# Patient Record
Sex: MALE | Race: WHITE | HISPANIC OR LATINO | Employment: FULL TIME | URBAN - METROPOLITAN AREA
[De-identification: names, ages, dates, MRNs, and addresses within clinical notes are randomized per-mention and may not be internally consistent; named-entity substitution may affect disease eponyms.]

---

## 2023-09-21 ENCOUNTER — TRANSCRIBE ORDERS (OUTPATIENT)
Dept: ADMINISTRATIVE | Facility: HOSPITAL | Age: 28
End: 2023-09-21

## 2023-09-21 DIAGNOSIS — R10.13 EPIGASTRIC PAIN: ICD-10-CM

## 2023-09-21 DIAGNOSIS — R94.5 ABNORMAL LIVER FUNCTION: Primary | ICD-10-CM

## 2023-10-05 ENCOUNTER — HOSPITAL ENCOUNTER (OUTPATIENT)
Dept: ULTRASOUND IMAGING | Facility: HOSPITAL | Age: 28
Discharge: HOME | End: 2023-10-05
Admitting: NURSE PRACTITIONER
Payer: COMMERCIAL

## 2023-10-05 DIAGNOSIS — R94.5 ABNORMAL LIVER FUNCTION: ICD-10-CM

## 2023-10-05 DIAGNOSIS — R10.13 EPIGASTRIC PAIN: ICD-10-CM

## 2023-10-05 PROCEDURE — 76700 US EXAM ABDOM COMPLETE: CPT

## 2024-11-14 ENCOUNTER — TRANSCRIBE ORDERS (OUTPATIENT)
Dept: ADMINISTRATIVE | Facility: HOSPITAL | Age: 29
End: 2024-11-14
Payer: COMMERCIAL

## 2024-11-14 ENCOUNTER — HOSPITAL ENCOUNTER (OUTPATIENT)
Dept: GENERAL RADIOLOGY | Facility: HOSPITAL | Age: 29
Discharge: HOME | End: 2024-11-14
Payer: COMMERCIAL

## 2024-11-14 DIAGNOSIS — R05.3 PERSISTENT COUGH: ICD-10-CM

## 2024-11-14 DIAGNOSIS — R05.3 PERSISTENT COUGH: Primary | ICD-10-CM

## 2024-11-14 DIAGNOSIS — M25.532 LEFT WRIST PAIN: ICD-10-CM

## 2024-11-14 PROCEDURE — 71046 X-RAY EXAM CHEST 2 VIEWS: CPT

## 2024-11-14 PROCEDURE — 73110 X-RAY EXAM OF WRIST: CPT

## 2025-04-16 ENCOUNTER — OFFICE VISIT (OUTPATIENT)
Dept: SLEEP MEDICINE | Facility: HOSPITAL | Age: 30
End: 2025-04-16
Payer: COMMERCIAL

## 2025-04-16 VITALS
WEIGHT: 242 LBS | HEIGHT: 66 IN | HEART RATE: 73 BPM | BODY MASS INDEX: 38.89 KG/M2 | DIASTOLIC BLOOD PRESSURE: 69 MMHG | SYSTOLIC BLOOD PRESSURE: 112 MMHG | OXYGEN SATURATION: 100 %

## 2025-04-16 DIAGNOSIS — G47.26 CIRCADIAN RHYTHM SLEEP DISORDER, SHIFT WORK TYPE: ICD-10-CM

## 2025-04-16 DIAGNOSIS — R06.81 WITNESSED EPISODE OF APNEA: ICD-10-CM

## 2025-04-16 DIAGNOSIS — G47.10 HYPERSOMNIA: Primary | ICD-10-CM

## 2025-04-16 DIAGNOSIS — R06.83 LOUD SNORING: ICD-10-CM

## 2025-04-16 DIAGNOSIS — E66.9 OBESITY (BMI 30-39.9): ICD-10-CM

## 2025-04-16 PROCEDURE — G0463 HOSPITAL OUTPT CLINIC VISIT: HCPCS

## 2025-04-16 RX ORDER — ZOLPIDEM TARTRATE 5 MG/1
5 TABLET ORAL NIGHTLY PRN
Qty: 1 TABLET | Refills: 0 | Status: SHIPPED | OUTPATIENT
Start: 2025-04-16

## 2025-04-16 NOTE — PROGRESS NOTES
"[facility]  Telephone: [phone] / Fax: [phone] [place]  Telephone: [phone] / Fax: [phone] Naima Skinner    Referring Physician: Soledad العراقي APRN  PCP: Soledad العراقي APRN    Reason for consult:  sleep apnea    Jerardo Rosado is a 29 y.o.male  was seen in the [facility] today for evaluation of sleep apnea.     He had negative HST 1-2 years ago. However, despite this he continued to have EDS, snoring, witnessed apneas. He lost weight but continued to have symptoms.  He wakes up choking/gasping for breath. He continues to feel sleepy /tired despite sleeping for 10 hrs.  Hypersomnia interferes with quality of life. 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    SH-     ROS- + GERD    Jerardo Rosado  has no past medical history on file.    Current Medications:  Statin    I have reviewed Past Medical History, Past Surgical History, Medication List, Social History and Family History as entered in Sleep Questionnaire and EPIC.    ESS  10   Vital Signs /69   Pulse 73   Ht 167.6 cm (66\")   Wt 110 kg (242 lb)   SpO2 100%   BMI 39.06 kg/m²  Body mass index is 39.06 kg/m².    General Alert and oriented. No acute distress noted   Pharynx/Throat Class  IV  Mallampati airway, large tongue, no evidence of redundant lateral pharyngeal tissue. No oral lesions. No thrush. Moist mucous membranes.   Head Normocephalic. Symmetrical. Atraumatic.    Nose No septal deviation. No drainage   Chest Wall Normal shape. Symmetric expansion with respiration. No tenderness.   Neck Trachea midline, no thyromegaly or adenopathy    Lungs Clear to auscultation bilaterally. No wheezes. No rhonchi. No rales. Respirations regular, even and unlabored.   Heart Regular rhythm and normal rate. Normal S1 and S2. No murmur   Abdomen Soft, non-tender and non-distended. Normal bowel sounds. No masses.   Extremities Moves all extremities well. No edema   Psychiatric " Normal mood and affect.        Impression:  1. Hypersomnia    2. Witnessed episode of apnea    3. Loud snoring    4. Obesity (BMI 30-39.9)    5. Circadian rhythm sleep disorder, shift work type          Plan  I discussed the pathophysiology of obstructive sleep apnea with the patient.  We discussed the adverse outcomes associated with untreated sleep-disordered breathing.  We discussed treatment modalities of obstructive sleep apnea including CPAP device. Sleep study will be scheduled to establish a definitive diagnosis of sleep disorder breathing.  Do the study with 5mg Ambien to improve sleep efficiency in the lab. Strict instruction give to pt to not take it at home. Weight loss strongly advised.          I appreciate the opportunity to participate in this patient's care.      CHAITANYA Ku  [facility]  Phone: [phone]      Part of this note may be an electronic transcription/translation of spoken language to printed text using the Dragon Dictation System. Some errors may exist even though the document was edited.

## 2025-04-29 ENCOUNTER — HOSPITAL ENCOUNTER (OUTPATIENT)
Dept: SLEEP MEDICINE | Facility: HOSPITAL | Age: 30
Discharge: HOME | End: 2025-04-29
Admitting: NURSE PRACTITIONER
Payer: COMMERCIAL

## 2025-04-29 DIAGNOSIS — R06.81 WITNESSED EPISODE OF APNEA: ICD-10-CM

## 2025-04-29 DIAGNOSIS — G47.10 HYPERSOMNIA: ICD-10-CM

## 2025-04-29 DIAGNOSIS — R06.83 LOUD SNORING: ICD-10-CM

## 2025-04-29 DIAGNOSIS — E66.9 OBESITY (BMI 30-39.9): ICD-10-CM

## 2025-04-29 PROCEDURE — 95811 POLYSOM 6/>YRS CPAP 4/> PARM: CPT

## 2025-05-07 DIAGNOSIS — G47.33 OBSTRUCTIVE SLEEP APNEA, ADULT: Primary | ICD-10-CM

## 2025-05-09 ENCOUNTER — TELEPHONE (OUTPATIENT)
Dept: SLEEP MEDICINE | Facility: HOSPITAL | Age: 30
End: 2025-05-09
Payer: COMMERCIAL

## 2025-05-09 NOTE — TELEPHONE ENCOUNTER
VM was full when calling pt for sleep study results. Device order has been sent over to [facility].    Pt called back on 5/9/2025 and was given sleep study results.  Pt verbalized that he understood and also understood where the order has been sent.

## 2025-06-30 ENCOUNTER — OFFICE VISIT (OUTPATIENT)
Dept: SLEEP MEDICINE | Facility: HOSPITAL | Age: 30
End: 2025-06-30
Payer: COMMERCIAL

## 2025-06-30 VITALS
HEIGHT: 66 IN | BODY MASS INDEX: 38.25 KG/M2 | HEART RATE: 67 BPM | WEIGHT: 238 LBS | SYSTOLIC BLOOD PRESSURE: 111 MMHG | OXYGEN SATURATION: 99 % | DIASTOLIC BLOOD PRESSURE: 67 MMHG

## 2025-06-30 DIAGNOSIS — G47.33 OBSTRUCTIVE SLEEP APNEA, ADULT: Primary | ICD-10-CM

## 2025-06-30 DIAGNOSIS — E66.9 OBESITY (BMI 30-39.9): ICD-10-CM

## 2025-06-30 DIAGNOSIS — Z78.9 DIFFICULTY WITH CPAP USE: ICD-10-CM

## 2025-06-30 PROCEDURE — G0463 HOSPITAL OUTPT CLINIC VISIT: HCPCS

## 2025-06-30 NOTE — PROGRESS NOTES
"[facility] [place] [facility]  [address]  [place] [address]  [phone]    PCP: Soledad العراقي APRN    Reason for visit:  Sleep disorders: BO    Jerardo \"Marshall\" is a 30 y.o.male who was seen in the Sleep Disorders Center today. Compliance fu. He is sleeping well with it. Sleeps from 10:30pm to 6:35am. Using ffm, fits well. Wakes up refreshed and rested. He does not like his current mask, needs ResMed Air Touch F20 ffm medium size. No EDS.  [place] Sleepiness Scale is 3. Caffeine 3 per day. Alcohol 0 per week.    Jerardo \"Marshall\"  reports that he has never smoked. He has never used smokeless tobacco.    Pertinent Positive Review of Systems of acid reflux  Rest of Review of Systems was negative as recorded in Sleep Questionnaire.    Patient  has no past medical history on file. hyperlipidemia    Current Medications:  No current outpatient medications on file.   also entered in Sleep Questionnaire         Vital Signs: /67   Pulse 67   Ht 167.6 cm (65.98\")   Wt 108 kg (238 lb)   SpO2 99%   BMI 38.43 kg/m²     Body mass index is 38.43 kg/m².       Tongue: Large       Dentition: good       Pharynx: Posterior pharyngeal pillars are wide   Mallampatti: III (soft and hard palate and base of uvula visible)        General: Alert. Cooperative. Well developed. No acute distress.             Nose: No septal deviation. No drainage.          Throat: No oral lesions. No thrush. Moist mucous membranes.           Lungs:  Clear to auscultation bilaterally.            Heart:  Regular rhythm and normal rate. No murmur.     Diagnostic data available to date is as below and was reviewed on current visit:  4/29/25: Overnight split polysomnogram study.  Diagnostic study 9:11 PM to 12:34 AM.  Sleep efficiency 89.7%.  Sleep distribution shows reduced REM sleep at 4.7%.  AHI index 9.2 indicating mild obstructive sleep apnea.  There was minimal REM sleep and no sleep apnea seen in this stage.  Patient slept in " "the supine position only.  Snoring for 90% of sleep time.  Arousal index 22.4.  Oxygen saturation remained above 88%.  Titration study from 12:34 AM to 5:53 AM.  Sleep efficiency poor at 38%.  However a rebound and REM sleep to 17% of total sleep time was seen.  AHI index increased to 22.2.  Arousal index 30.8.  PLM index 11.8.  Oxygen saturation remained above 88%.  CPAP initially tried at 6 cm and increased up to 14 cm.  Maximum sleep at 12 cm water pressure but maximum REM sleep at 14 cm water pressure.  AHI and oxygen saturation were better at pressures of 12 cm and higher.  Sleep apnea was not completely corrected even at 14 cm water pressure according to the hypnogram.  Optimal pressures could not be achieved on the titration portion due to patient difficulty with sleeping with the mask.    No results found for: \"IRON\", \"TIBC\", \"FERRITIN\"    Most current available usage data reviewed on 06/30/2025:  No scans are attached to the encounter or orders placed in the encounter.      DME Company: [employer]    [place] [facility] [place] as below:    full face mask    Replace head-gear every 3 months.  Replace cushions every 2-4 weeks.     A 4604 Heated Tubing  every 3 mth    A 7037 Standard Tubing  every 3 mth   x A 7035 Headgear  every 3 mth   x A 7046 Repl Humidifier Chamber  every 6 yrs   x A 7038 Disposable Filters  2 per mth   x A 7039 Non-disposable Filter  every 6 mth   x A 7036 Chin Strap  every 6 mth     No orders of the defined types were placed in this encounter.         Impression:  1. Obstructive sleep apnea, adult    2. Obesity (BMI 30-39.9)    3. Difficulty with CPAP use        Plan:  Jerardo \"Marshall\" needs proper fitting mask as above. Otherwise he is doing well and likes his machine. Will keep on same settings. Machine is not recording all the sleep time - only 4 hrs out of the 8 hrs that he gets. He is presently working day shift. Will speak to [employer]    Effective treatment of sleep " apnea reduces the associated cardiovascular and cerebrovascular risks associated. In patients with elevated BMI, weight loss can be additionally beneficial. With use of positive pressure device; change of supplies per the permitted insurance schedule is necessary.    This patient is compliant with PAP machine and benefits from its use.  Apnea hypopneas index is corrected/improved.  Daytime hypersomnolence has resolved.     Patient will follow up in this clinic in 3 months  APRN    Thank you for allowing me to participate in your patient's care.    Electronically signed by Eagle Cardoso MD, 06/30/25, 9:48 AM EDT.    Part of this note may be an electronic transcription/translation of spoken language to printed text using the Dragon Dictation System.